# Patient Record
Sex: FEMALE | Race: WHITE | NOT HISPANIC OR LATINO | Employment: OTHER | URBAN - NONMETROPOLITAN AREA
[De-identification: names, ages, dates, MRNs, and addresses within clinical notes are randomized per-mention and may not be internally consistent; named-entity substitution may affect disease eponyms.]

---

## 2017-05-08 RX ORDER — ESTRADIOL 1 MG/1
TABLET ORAL
Qty: 30 TABLET | Refills: 12 | Status: SHIPPED | OUTPATIENT
Start: 2017-05-08 | End: 2018-06-11 | Stop reason: SDUPTHER

## 2018-06-11 RX ORDER — ESTRADIOL 1 MG/1
1 TABLET ORAL DAILY
Qty: 30 TABLET | Refills: 12 | Status: SHIPPED | OUTPATIENT
Start: 2018-06-11 | End: 2020-09-02 | Stop reason: SDUPTHER

## 2018-07-17 ENCOUNTER — OFFICE VISIT (OUTPATIENT)
Dept: OBSTETRICS AND GYNECOLOGY | Facility: CLINIC | Age: 53
End: 2018-07-17

## 2018-07-17 VITALS
DIASTOLIC BLOOD PRESSURE: 71 MMHG | BODY MASS INDEX: 30.55 KG/M2 | HEIGHT: 62 IN | WEIGHT: 166 LBS | SYSTOLIC BLOOD PRESSURE: 119 MMHG

## 2018-07-17 DIAGNOSIS — E66.9 OBESITY (BMI 30.0-34.9): Chronic | ICD-10-CM

## 2018-07-17 DIAGNOSIS — G43.C0 PERIODIC HEADACHE SYNDROME, NOT INTRACTABLE: Chronic | ICD-10-CM

## 2018-07-17 DIAGNOSIS — N95.1 MENOPAUSAL SYNDROME: Primary | ICD-10-CM

## 2018-07-17 DIAGNOSIS — M35.9 CONNECTIVE TISSUE DISEASE (HCC): Chronic | ICD-10-CM

## 2018-07-17 PROBLEM — G43.909 MIGRAINE: Chronic | Status: ACTIVE | Noted: 2018-07-17

## 2018-07-17 PROCEDURE — 99203 OFFICE O/P NEW LOW 30 MIN: CPT | Performed by: OBSTETRICS & GYNECOLOGY

## 2018-07-17 RX ORDER — ROPINIROLE 0.25 MG/1
TABLET, FILM COATED ORAL
COMMUNITY
End: 2019-11-20

## 2018-07-17 RX ORDER — LISINOPRIL 5 MG/1
TABLET ORAL
COMMUNITY
Start: 2018-05-25

## 2018-07-17 RX ORDER — HYDROCODONE BITARTRATE AND ACETAMINOPHEN 10; 325 MG/1; MG/1
TABLET ORAL
COMMUNITY
End: 2019-11-20

## 2018-07-17 RX ORDER — ALPRAZOLAM 0.25 MG/1
TABLET ORAL
COMMUNITY

## 2018-07-17 RX ORDER — HYDROXYCHLOROQUINE SULFATE 200 MG/1
TABLET, FILM COATED ORAL
COMMUNITY

## 2018-07-17 RX ORDER — GABAPENTIN 300 MG/1
CAPSULE ORAL
COMMUNITY

## 2018-07-17 RX ORDER — ESTRADIOL 1 MG/1
1 TABLET ORAL DAILY
Qty: 90 TABLET | Refills: 4 | Status: SHIPPED | OUTPATIENT
Start: 2018-07-17 | End: 2019-09-05 | Stop reason: SDUPTHER

## 2018-07-17 RX ORDER — ESTRADIOL 1 MG/1
TABLET ORAL
COMMUNITY
End: 2018-07-17 | Stop reason: SDUPTHER

## 2019-09-05 ENCOUNTER — TELEPHONE (OUTPATIENT)
Dept: OBSTETRICS AND GYNECOLOGY | Facility: CLINIC | Age: 54
End: 2019-09-05

## 2019-09-05 RX ORDER — ESTRADIOL 1 MG/1
1 TABLET ORAL DAILY
Qty: 90 TABLET | Refills: 0 | Status: SHIPPED | OUTPATIENT
Start: 2019-09-05 | End: 2019-11-11 | Stop reason: SDUPTHER

## 2019-09-05 NOTE — TELEPHONE ENCOUNTER
PATIENT IS NEEDING TO GET A ONE MONTH REFILL ON HER ESTRODIAL SENT TO [facility]. SHE HAS AN APPT WITH PER ON 10/09

## 2019-09-18 ENCOUNTER — TELEPHONE (OUTPATIENT)
Dept: OBSTETRICS AND GYNECOLOGY | Facility: CLINIC | Age: 54
End: 2019-09-18

## 2019-11-11 ENCOUNTER — PROCEDURE VISIT (OUTPATIENT)
Dept: OBSTETRICS AND GYNECOLOGY | Facility: CLINIC | Age: 54
End: 2019-11-11

## 2019-11-11 VITALS
HEIGHT: 62 IN | SYSTOLIC BLOOD PRESSURE: 108 MMHG | WEIGHT: 174 LBS | BODY MASS INDEX: 32.02 KG/M2 | DIASTOLIC BLOOD PRESSURE: 70 MMHG

## 2019-11-11 DIAGNOSIS — M35.9 CONNECTIVE TISSUE DISEASE (HCC): ICD-10-CM

## 2019-11-11 DIAGNOSIS — M54.2 NECK PAIN: ICD-10-CM

## 2019-11-11 DIAGNOSIS — N64.4 BREAST PAIN, LEFT: Primary | ICD-10-CM

## 2019-11-11 PROCEDURE — 99213 OFFICE O/P EST LOW 20 MIN: CPT | Performed by: NURSE PRACTITIONER

## 2019-11-11 RX ORDER — ESTRADIOL 1 MG/1
1 TABLET ORAL DAILY
Qty: 90 TABLET | Refills: 4 | Status: SHIPPED | OUTPATIENT
Start: 2019-11-11 | End: 2020-03-10

## 2019-11-12 NOTE — PROGRESS NOTES
"Subjective   Chief Complaint   Patient presents with   • Gynecologic Exam     annual breast exam and mammogram      Tiffani Fabian is a 54 y.o. year old  presenting to be seen because of left breast pain at the 3 o'clock position. The pain first started about 2 and a half months ago. It starts in the axilla and radiates through the breast into the nipple. It is intermittent and unrelieved by tylenol and ibuprofen. .    · Last mammogram: was done on approximately 2018 and the result was: Birads II (Benign findings). She is scheduled for her screening mammogram at [facility] in December.   · Last breast MRI: she has never had a MRI    No Additional Complaints Reported   Needs refills on HRT.    The following portions of the patient's history were reviewed and updated as appropriate:problem list, current medications, allergies, past family history, past medical history, past social history and past surgical history    Review of Systems   Constitutional: Positive for fatigue. Negative for activity change, appetite change, diaphoresis, unexpected weight gain and unexpected weight loss.   Respiratory: Negative for chest tightness and shortness of breath.    Cardiovascular: Negative for chest pain and palpitations.   Genitourinary: Positive for breast pain. Negative for breast discharge, breast lump, decreased libido, dyspareunia, pelvic pressure, urinary incontinence, vaginal bleeding, vaginal discharge and vaginal pain.   Musculoskeletal: Positive for arthralgias, myalgias and neck pain. Negative for joint swelling.        Hx of connective tissue disorder a/w chronic joint pain and disc degeneration. Has been struggling with neck pain for about 3 months.         Objective   /70   Ht 157.5 cm (62\")   Wt 78.9 kg (174 lb)   Breastfeeding? No   BMI 31.83 kg/m²     General:  well developed; well nourished  no acute distress  appears stated age   Breasts:  Examined in supine position  Symmetric without " masses or skin dimpling  Nipples normal without inversion, lesions or discharge  There are no palpable axillary nodes  Mastectomy site well healed without palpable abnormalities  The is myofascial pain noted coming from the scapula, around the axilla and into the chest wall.      Lab Review   No data reviewed    Imaging   No data reviewed      Diagnoses and all orders for this visit:    Breast pain, left    Neck pain  -     Ambulatory Referral to Physical Therapy Evaluate and treat    Connective tissue disease (CMS/HCC)  -     Ambulatory Referral to Physical Therapy Evaluate and treat    Other orders  -     estradiol (ESTRACE) 1 MG tablet; Take 1 tablet by mouth Daily.    R/B/A and potential s/e of HRT reviewed. Referral sent to PFPT with Jacquelyn, possibly for dry-needling, but for eval and treat of neck pain.           This note was electronically signed.    Cristine Bentley, YASMANY  November 12, 2019

## 2019-11-20 ENCOUNTER — OFFICE VISIT (OUTPATIENT)
Dept: FAMILY MEDICINE CLINIC | Facility: CLINIC | Age: 54
End: 2019-11-20

## 2019-11-20 VITALS
HEART RATE: 84 BPM | OXYGEN SATURATION: 96 % | TEMPERATURE: 97.6 F | SYSTOLIC BLOOD PRESSURE: 124 MMHG | WEIGHT: 173.25 LBS | DIASTOLIC BLOOD PRESSURE: 86 MMHG | RESPIRATION RATE: 20 BRPM | HEIGHT: 62 IN | BODY MASS INDEX: 31.88 KG/M2

## 2019-11-20 DIAGNOSIS — N39.0 ACUTE UTI: Primary | ICD-10-CM

## 2019-11-20 DIAGNOSIS — R30.0 DYSURIA: ICD-10-CM

## 2019-11-20 LAB
BILIRUB BLD-MCNC: ABNORMAL MG/DL
CLARITY, POC: CLEAR
COLOR UR: ABNORMAL
GLUCOSE UR STRIP-MCNC: NEGATIVE MG/DL
KETONES UR QL: NEGATIVE
LEUKOCYTE EST, POC: ABNORMAL
NITRITE UR-MCNC: POSITIVE MG/ML
PH UR: 6 [PH] (ref 5–8)
PROT UR STRIP-MCNC: NEGATIVE MG/DL
RBC # UR STRIP: NEGATIVE /UL
SP GR UR: 1.02 (ref 1–1.03)
UROBILINOGEN UR QL: ABNORMAL

## 2019-11-20 PROCEDURE — 87086 URINE CULTURE/COLONY COUNT: CPT | Performed by: NURSE PRACTITIONER

## 2019-11-20 PROCEDURE — 99202 OFFICE O/P NEW SF 15 MIN: CPT | Performed by: NURSE PRACTITIONER

## 2019-11-20 PROCEDURE — 81003 URINALYSIS AUTO W/O SCOPE: CPT | Performed by: NURSE PRACTITIONER

## 2019-11-20 PROCEDURE — 96372 THER/PROPH/DIAG INJ SC/IM: CPT | Performed by: NURSE PRACTITIONER

## 2019-11-20 RX ORDER — SULFAMETHOXAZOLE AND TRIMETHOPRIM 800; 160 MG/1; MG/1
1 TABLET ORAL 2 TIMES DAILY
Qty: 20 TABLET | Refills: 0 | Status: SHIPPED | OUTPATIENT
Start: 2019-11-20

## 2019-11-20 RX ORDER — PREDNISONE 1 MG/1
1 TABLET ORAL DAILY
COMMUNITY

## 2019-11-20 RX ORDER — CEFTRIAXONE 1 G/1
1 INJECTION, POWDER, FOR SOLUTION INTRAMUSCULAR; INTRAVENOUS ONCE
Status: COMPLETED | OUTPATIENT
Start: 2019-11-20 | End: 2019-11-20

## 2019-11-20 RX ORDER — FLUCONAZOLE 150 MG/1
TABLET ORAL
Qty: 2 TABLET | Refills: 0 | Status: SHIPPED | OUTPATIENT
Start: 2019-11-20

## 2019-11-20 RX ADMIN — CEFTRIAXONE 1 G: 1 INJECTION, POWDER, FOR SOLUTION INTRAMUSCULAR; INTRAVENOUS at 08:23

## 2019-11-20 NOTE — PROGRESS NOTES
"Subjective   Tiffani Fabian is a 54 y.o. female.     [facility] Visit    PCP: Dr. Haider    CC: \"UTI\"    PMH: Connective tissue disease, [place], back surgery x 2, hand surgery x 2, hysterectomy    Reports she was busy and didn't drink much water for a few days and then symptoms started.  Usually has a UTI 1-2 times per year.  Last treated about 6 months ago.       Urinary Tract Infection    This is a new problem. Episode onset: 11-16. The problem occurs every urination. The problem has been gradually worsening. The quality of the pain is described as aching and burning. The patient is experiencing no pain (current due to taking Azo, but was up most of the night). There has been no fever. Associated symptoms include frequency and urgency. Pertinent negatives include no chills, discharge, flank pain, hematuria, hesitancy, nausea, possible pregnancy, sweats or vomiting. Treatments tried: azo, increased water. The treatment provided mild relief. Her past medical history is significant for recurrent UTIs.        The following portions of the patient's history were reviewed and updated as appropriate: allergies, current medications, past medical history, past social history, past surgical history and problem list.    Review of Systems   Constitutional: Negative for appetite change, chills, fatigue and fever.   Respiratory: Negative.    Cardiovascular: Negative.    Gastrointestinal: Negative for abdominal pain, nausea and vomiting.   Genitourinary: Positive for dysuria, frequency, pelvic pressure ( suprapubic) and urgency. Negative for difficulty urinating, flank pain, hematuria and hesitancy.   Musculoskeletal: Positive for back pain (low).   Skin: Negative for rash.   Neurological: Negative for dizziness and headache.     /86 (BP Location: Right arm, Patient Position: Sitting, Cuff Size: Adult)   Pulse 84   Temp 97.6 °F (36.4 °C) (Oral)   Resp 20   Ht 157.5 cm (62\")   Wt 78.6 kg (173 lb 4 oz)   " SpO2 96%   Breastfeeding? No   BMI 31.69 kg/m²     Objective   Physical Exam   Constitutional: She is oriented to person, place, and time. She appears well-developed and well-nourished. No distress.   Cardiovascular: Normal rate and regular rhythm.   Pulmonary/Chest: Effort normal and breath sounds normal. She has no wheezes. She has no rales.   Abdominal: Soft. Bowel sounds are normal. There is tenderness ( suprapubic). There is no rebound and no guarding.   Genitourinary:   Genitourinary Comments: No flank pain noted   Neurological: She is alert and oriented to person, place, and time.   Nursing note and vitals reviewed.    Recent Results (from the past 24 hour(s))   POCT urinalysis dipstick, automated    Collection Time: 11/20/19  8:24 AM   Result Value Ref Range    Color Orange (A) Yellow, Straw, Dark Yellow, Rosita    Clarity, UA Clear Clear    Specific Gravity  1.020 1.005 - 1.030    pH, Urine 6.0 5.0 - 8.0    Leukocytes Trace (A) Negative    Nitrite, UA Positive (A) Negative    Protein, POC Negative Negative mg/dL    Glucose, UA Negative Negative, 1000 mg/dL (3+) mg/dL    Ketones, UA Negative Negative    Urobilinogen, UA 1 E.U./dL  (A) Normal    Bilirubin 1 mg/dL (A) Negative    Blood, UA Negative Negative     No Images in the past 120 days found..      Assessment/Plan   Tiffani was seen today for urinary tract infection.    Diagnoses and all orders for this visit:    Acute UTI  -     Urine Culture - Urine, Urine, Clean Catch  -     cefTRIAXone (ROCEPHIN) injection 1 g  -     sulfamethoxazole-trimethoprim (BACTRIM DS) 800-160 MG per tablet; Take 1 tablet by mouth 2 (Two) Times a Day.    Dysuria  -     POCT urinalysis dipstick, automated    Other orders  -     fluconazole (DIFLUCAN) 150 MG tablet; 1 tab po x 1 now, may repeat in 4 days prn yeast      Increase water, decrease caffeine  Empty bladder frequently  Tylenol as needed  Rocephin 1 gm IM x 1 in office  Rx for Bactrim to begin tonight  Urine culture  pending--will call if antibiotics need to be changed  May use Azo for the next few days as needed    Patient's Body mass index is 31.69 kg/m². BMI is above normal parameters. Recommendations include: referral to primary care.    See PCP or RTC if symptoms persist/worsen  See PCP for routine f/u visit and management of chronic medical conditions

## 2019-11-20 NOTE — PATIENT INSTRUCTIONS
Urinary Tract Infection, Adult    A urinary tract infection (UTI) is an infection of any part of the urinary tract. The urinary tract includes the kidneys, ureters, bladder, and urethra. These organs make, store, and get rid of urine in the body.  Your health care provider may use other names to describe the infection. An upper UTI affects the ureters and kidneys (pyelonephritis). A lower UTI affects the bladder (cystitis) and urethra (urethritis).  What are the causes?  Most urinary tract infections are caused by bacteria in your genital area, around the entrance to your urinary tract (urethra). These bacteria grow and cause inflammation of your urinary tract.  What increases the risk?  You are more likely to develop this condition if:  · You have a urinary catheter that stays in place (indwelling).  · You are not able to control when you urinate or have a bowel movement (you have incontinence).  · You are female and you:  ? Use a spermicide or diaphragm for birth control.  ? Have low estrogen levels.  ? Are pregnant.  · You have certain genes that increase your risk (genetics).  · You are sexually active.  · You take antibiotic medicines.  · You have a condition that causes your flow of urine to slow down, such as:  ? An enlarged prostate, if you are male.  ? Blockage in your urethra (stricture).  ? A kidney stone.  ? A nerve condition that affects your bladder control (neurogenic bladder).  ? Not getting enough to drink, or not urinating often.  · You have certain medical conditions, such as:  ? Diabetes.  ? A weak disease-fighting system (immunesystem).  ? Sickle cell disease.  ? Gout.  ? Spinal cord injury.  What are the signs or symptoms?  Symptoms of this condition include:  · Needing to urinate right away (urgently).  · Frequent urination or passing small amounts of urine frequently.  · Pain or burning with urination.  · Blood in the urine.  · Urine that smells bad or unusual.  · Trouble urinating.  · Cloudy  urine.  · Vaginal discharge, if you are female.  · Pain in the abdomen or the lower back.  You may also have:  · Vomiting or a decreased appetite.  · Confusion.  · Irritability or tiredness.  · A fever.  · Diarrhea.  The first symptom in older adults may be confusion. In some cases, they may not have any symptoms until the infection has worsened.  How is this diagnosed?  This condition is diagnosed based on your medical history and a physical exam. You may also have other tests, including:  · Urine tests.  · Blood tests.  · Tests for sexually transmitted infections (STIs).  If you have had more than one UTI, a cystoscopy or imaging studies may be done to determine the cause of the infections.  How is this treated?  Treatment for this condition includes:  · Antibiotic medicine.  · Over-the-counter medicines to treat discomfort.  · Drinking enough water to stay hydrated.  If you have frequent infections or have other conditions such as a kidney stone, you may need to see a health care provider who specializes in the urinary tract (urologist).  In rare cases, urinary tract infections can cause sepsis. Sepsis is a life-threatening condition that occurs when the body responds to an infection. Sepsis is treated in the hospital with IV antibiotics, fluids, and other medicines.  Follow these instructions at home:    Medicines  · Take over-the-counter and prescription medicines only as told by your health care provider.  · If you were prescribed an antibiotic medicine, take it as told by your health care provider. Do not stop using the antibiotic even if you start to feel better.  General instructions  · Make sure you:  ? Empty your bladder often and completely. Do not hold urine for long periods of time.  ? Empty your bladder after sex.  ? Wipe from front to back after a bowel movement if you are female. Use each tissue one time when you wipe.  · Drink enough fluid to keep your urine pale yellow.  · Keep all follow-up  visits as told by your health care provider. This is important.  Contact a health care provider if:  · Your symptoms do not get better after 1-2 days.  · Your symptoms go away and then return.  Get help right away if you have:  · Severe pain in your back or your lower abdomen.  · A fever.  · Nausea or vomiting.  Summary  · A urinary tract infection (UTI) is an infection of any part of the urinary tract, which includes the kidneys, ureters, bladder, and urethra.  · Most urinary tract infections are caused by bacteria in your genital area, around the entrance to your urinary tract (urethra).  · Treatment for this condition often includes antibiotic medicines.  · If you were prescribed an antibiotic medicine, take it as told by your health care provider. Do not stop using the antibiotic even if you start to feel better.  · Keep all follow-up visits as told by your health care provider. This is important.  This information is not intended to replace advice given to you by your health care provider. Make sure you discuss any questions you have with your health care provider.  Document Released: 09/27/2006 Document Revised: 06/27/2019 Document Reviewed: 06/27/2019  [employer] Interactive Patient Education © 2019 [employer]

## 2019-11-22 LAB — BACTERIA SPEC AEROBE CULT: NO GROWTH

## 2019-11-25 ENCOUNTER — TREATMENT (OUTPATIENT)
Dept: PHYSICAL THERAPY | Facility: CLINIC | Age: 54
End: 2019-11-25

## 2019-11-25 DIAGNOSIS — G43.C0 PERIODIC HEADACHE SYNDROME, NOT INTRACTABLE: ICD-10-CM

## 2019-11-25 DIAGNOSIS — M35.9 CONNECTIVE TISSUE DISEASE (HCC): ICD-10-CM

## 2019-11-25 DIAGNOSIS — M54.2 NECK PAIN: Primary | ICD-10-CM

## 2019-11-25 PROCEDURE — 97162 PT EVAL MOD COMPLEX 30 MIN: CPT | Performed by: PHYSICAL THERAPIST

## 2019-11-25 PROCEDURE — 97110 THERAPEUTIC EXERCISES: CPT | Performed by: PHYSICAL THERAPIST

## 2019-11-25 NOTE — PROGRESS NOTES
"  Physical Therapy Initial Evaluation and Plan of Care      Patient: Tiffani Fabian   : 1965  Diagnosis/ICD-10 Code:  Neck pain [M54.2]  Referring practitioner: YASMANY Red  Date of Initial Visit: 2019  Today's Date: 2019  Patient seen for 1 sessions    Next MD appt: PRN .  Recertification: 2019           Subjective Questionnaire: NDI:23/50 = 46%      Subjective Evaluation    History of Present Illness  Onset date: ~1.5 years.    Subjective comment: Patient reports she hates her neck. She rpeorts for about a year and a half she has had issues. She reports she has seen Dr. Brown which didn't seem to help any. She reports it is continious pain. She rpeorts looking up really bother her. She rpeorts at night it is the worst tryingto get comfortable.  She reports she is constantly giving into it. She reports even bending over bother it. She reports it feels like her head weighs 800#. She reports chin tucks do help some.Quality of life: good    Pain  Current pain ratin  At best pain rating: 3  At worst pain rating: 10  Location: Neck and L shoulder  Quality: burning, dull ache and knife-like  Relieving factors: heat and medications  Aggravating factors: movement, overhead activity, prolonged positioning and sleeping    Social Support  Lives with: spouse    Hand dominance: right    Diagnostic Tests  X-ray: abnormal (DDD)    Treatments  Previous treatment: physical therapy  Current treatment: medication  Patient Goals  Patient goals for therapy: decreased pain  Patient goal: \"Avoid surgery and how to cope\"           Objective       Static Posture     Head  Forward.    Cervical Spine  Tilted left.    Shoulders  Rounded.    Comments  Leans to the L in sitting.    Postural Observations  Seated posture: poor  Standing posture: fair  Correction of posture: makes symptoms worse        Palpation     Right Tenderness of the scalenes and upper trapezius.     Neurological Testing "     Sensation   Cervical/Thoracic   Left   Intact: light touch and hot/cold discrimination    Right   Intact: light touch and hot/cold discrimination    Active Range of Motion   Cervical/Thoracic Spine   Cervical    Flexion: 52 degrees   Extension: 42 degrees   Left lateral flexion: 25 degrees   Right lateral flexion: 25 degrees   Left rotation: 62 degrees   Right rotation: 52 degrees     Strength/Myotome Testing   Cervical Spine   Neck extension: 3+  Neck flexion: 3+    Left   Neck lateral flexion (C3): 3    Right   Neck lateral flexion (C3): 3    Left Shoulder     Planes of Motion   Flexion: 3+   Abduction: 3+     Right Shoulder     Planes of Motion   Flexion: 3+   Abduction: 3+     Left Elbow   Flexion: 3+  Extension: 3+    Right Elbow   Flexion: 3+  Extension: 3+    Tests   Cervical     Left   Negative cervical distraction, Spurling's sign and transverse ligament.     Right   Negative cervical distraction, Spurling's sign and transverse ligament.     Left Shoulder   Negative active compression ([place]).     Right Shoulder   Negative active compression ([place]).     Additional Tests Details  Negative vertebral artery B.    Ambulation     Comments   FWB, non-antalgic gait, no distress, no assistive device, no significant gait deviation, normal arm swing with gait.         Assessment & Plan     Assessment  Impairments: abnormal or restricted ROM, activity intolerance, impaired physical strength, lacks appropriate home exercise program and pain with function  Assessment details: Patient has significant muscular tension and weakness and presents with chronic issues. Patient would benefit from PT for stretches, strengthening, posture, and deep cervical re-education.  Prognosis: fair  Prognosis details: Barriers to Rehab: Include significant or possible arthritic/degenerative changes that have occurred within the spine, The chronicity of this issue, other connective tissue disease.    Safety Issues: None  noted.    Functional Limitations: carrying objects, lifting, sleeping, pulling, pushing, uncomfortable because of pain, sitting and reaching overhead  Goals  Plan Goals: Short Term Goals:  1) I with HEP and have additions/changes by next re-certification.    2) Patient to be more aware of posture and posture correction technique.    3) AROM cervical flexion/extension >=55° each.    4) AROM B cervical SB >= 35°.    5) AROM B cervical ROT >=70°.    4) C-spine >= 4/5.    5) B UE >= 4/5.      Long Term Goals:  1) AROM for the cervical spine all WNL, no increase in pain.    2) B UE 5/5, no increase in pain.    3) C-spine 5/5, no increase in pain.    4) patient to report headaches are no longer daily.    5) NDI score of <= 30%.    6) I with final HEP.    7) D/C with a final HEP and free 30 day fitness formula [employer]bership.      Plan  Therapy options: will be seen for skilled physical therapy services  Planned modality interventions: cryotherapy, high voltage pulsed current (pain management) and thermotherapy (hydrocollator packs)  Planned therapy interventions: flexibility, body mechanics training, home exercise program, joint mobilization, manual therapy, neuromuscular re-education, postural training, soft tissue mobilization, spinal/joint mobilization, strengthening, stretching and therapeutic activities  Duration in visits: 20  Treatment plan discussed with: patient  Plan details: Progress overall posture, strength, neck stability. Add bobble heads next session.    Other therapeutic activities and/or exercises will be prescribed depending on the patients progress or lack there of.    Visit Diagnoses:    ICD-10-CM ICD-9-CM   1. Neck pain M54.2 723.1   2. Connective tissue disease (CMS/[facility]) M35.9 710.9   3. Periodic headache syndrome, not intractable G43.C0 346.20       Timed:  Manual Therapy:         mins  64312;  Therapeutic Exercise:    24     mins  20949;     Neuromuscular Kathy:        mins  35850;    Therapeutic  Activity:          mins  07186;     Gait Training:           mins  48134;     Ultrasound:          mins  85580;    Electrical Stimulation:         mins  15479 ( );    Untimed:  Electrical Stimulation:         mins  48803 ( );  Mechanical Traction:         mins  23797;     Timed Treatment:   24   mins   Total Treatment:     61   mins    PT SIGNATURE: Jacquelyn Mason, MALCOLM DPT, [facility]   DATE TREATMENT INITIATED: 11/25/2019    Initial Certification  Certification Period: 2/23/2020  I certify that the therapy services are furnished while this patient is under my care.  The services outlined above are required by this patient, and will be reviewed every 90 days.     PHYSICIAN: Cristine Bentley, APRN      DATE:     Please sign and return via fax to  .. Thank you, [facility].

## 2019-12-19 ENCOUNTER — TREATMENT (OUTPATIENT)
Dept: PHYSICAL THERAPY | Facility: CLINIC | Age: 54
End: 2019-12-19

## 2019-12-19 DIAGNOSIS — G43.C0 PERIODIC HEADACHE SYNDROME, NOT INTRACTABLE: ICD-10-CM

## 2019-12-19 DIAGNOSIS — M54.2 NECK PAIN: Primary | ICD-10-CM

## 2019-12-19 DIAGNOSIS — M35.9 CONNECTIVE TISSUE DISEASE (HCC): ICD-10-CM

## 2019-12-19 PROCEDURE — 97110 THERAPEUTIC EXERCISES: CPT | Performed by: PHYSICAL THERAPIST

## 2019-12-19 NOTE — PROGRESS NOTES
Physical Therapy Daily Progress Note    Patient: Tiffani Fabian   : 1965  Diagnosis/ICD-10 Code:     Diagnosis Plan   1. Neck pain     2. Connective tissue disease (CMS/HCC)     3. Periodic headache syndrome, not intractable       Referring practitioner: YASMANY Red  Date of Initial Visit: Type: THERAPY  Noted: 2019  Today's Date: 2019  Patient seen for 2 sessions      PT Recheck Due: 2019  PT MD Visit: PRN       Tiffani Fabian      Subjective Evaluation    History of Present Illness    Subjective comment: states that she just drove 12 hours back from [place] the other day. always has pain after long driving like that. had a headache throughout the night. with pedaling backwards had a couple of sharp pains that went up her neck. travels back and forth to [facility] every few weeks and stays for 2-3 weeks at a time. difficult to be consistent with physical therapy. has not been completing exercises at home as issued on evaluation day. Pain  Current pain ratin             Objective   See Exercise, Manual, and Modality Logs for complete treatment.       Assessment & Plan     Assessment  Assessment details: Per patient report, patient is non compliant with HEP. Has had no visits of physical therapy since she was first evaluated. Cueing for appropriate technique with all therex.     Goals  Plan Goals:  Short Term Goals:  1) I with HEP and have additions/changes by next re-certification.    2) Patient to be more aware of posture and posture correction technique.    3) AROM cervical flexion/extension >=55° each.    4) AROM B cervical SB >= 35°.    5) AROM B cervical ROT >=70°.    4) C-spine >= 4/5.    5) B UE >= 4/5.      Long Term Goals:  1) AROM for the cervical spine all WNL, no increase in pain.    2) B UE 5/5, no increase in pain.    3) C-spine 5/5, no increase in pain.    4) patient to report headaches are no longer daily.    5) NDI score of <= 30%.    6) I with final  HEP.    7) D/C with a final HEP and free 30 day fitness formula [facility].    Plan  Plan details: Recheck must be completed next visit      Progress strengthening /stabilization /functional activity and Other: Recheck must be completed next visit            Timed:  Manual Therapy:         mins  22906;  Therapeutic Exercise:    40     mins  16182;   Aquatic Therex :        mins  78244    Neuromuscular Kathy:        mins  81515;    Therapeutic Activity:          mins  41372;     Gait Training:           mins  02702;     Ultrasound:          mins  33473;    Electrical Stimulation:         mins  63763 ( );    Untimed:  Electrical Stimulation:         mins  74623 ( );  Mechanical Traction:         mins  09920;   Orthotic fit/train:         mins  68384    Timed Treatment:   40   mins   Total Treatment:     55   mins  Brittany Curran PTA  Physical Therapist Assistant

## 2019-12-27 ENCOUNTER — TREATMENT (OUTPATIENT)
Dept: PHYSICAL THERAPY | Facility: CLINIC | Age: 54
End: 2019-12-27

## 2019-12-27 DIAGNOSIS — G43.C0 PERIODIC HEADACHE SYNDROME, NOT INTRACTABLE: ICD-10-CM

## 2019-12-27 DIAGNOSIS — M35.9 CONNECTIVE TISSUE DISEASE (HCC): ICD-10-CM

## 2019-12-27 DIAGNOSIS — M54.2 NECK PAIN: Primary | ICD-10-CM

## 2019-12-27 PROCEDURE — 97110 THERAPEUTIC EXERCISES: CPT | Performed by: PHYSICAL THERAPIST

## 2019-12-27 PROCEDURE — 97140 MANUAL THERAPY 1/> REGIONS: CPT | Performed by: PHYSICAL THERAPIST

## 2019-12-27 NOTE — PROGRESS NOTES
Re-Assessment / Re-Certification        Patient: Tiffani Fabian   : 1965  Diagnosis/ICD-10 Code:  Neck pain [M54.2]  Referring practitioner: YASMANY Red  Date of Initial Visit: Type: THERAPY  Noted: 2019  Today's Date: 2019  Patient seen for 3 sessions  MD none  REcert 20    Subjective:   Tiffani Fabian reports: pt admits to non compliance at home and keeping appointments.  Goes out of town to stay with  in  frequently.  Subjective Questionnaire: NDI:46%  Clinical Progress: none at this time.   Home Program Compliance: No  Treatment has included: therapeutic exercise, therapeutic activity and moist heat    Subjective   Objective   Cervical flexion 37, ext 25, R rotation 40 left 56. SB right 22 and left 20  TTP suboccipitals and left C3-4 paraspinal region,  Left rotation of vertebra    Assessment/Plan  Lacks goal attainment due to non compliance and non attendance.    Visit Diagnoses:    ICD-10-CM ICD-9-CM   1. Neck pain M54.2 723.1   2. Connective tissue disease (CMS/HCC) M35.9 710.9   3. Periodic headache syndrome, not intractable G43.C0 346.20   Goals   Short Term Goals:  1) I with HEP and have additions/changes by next re-certification. met  2) Patient to be more aware of posture and posture correction technique. Not met  3) AROM cervical flexion/extension >=55° each. not met  4) AROM B cervical SB >= 35°. Not  met  5) AROM B cervical ROT >=70°. Not met  4) C-spine >= 4/5. not met  5) B UE >= 4/5. Not met    Long Term Goals:  1) AROM for the cervical spine all WNL, no increase in pain.  2) B UE 5/5, no increase in pain.  3) C-spine 5/5, no increase in pain.  4) patient to report headaches are no longer daily.  5) NDI score of <= 30%.  6) I with final HEP.  7) D/C with a final HEP and free 30 day fitness formula memembership.     Progress toward previous goals: Not Met        Recommendations: Continue as planned  Timeframe: 3 weeks  Prognosis to achieve goals:  good    PT Signature: Jaleesa Rico, PT DPT      Based upon review of the patient's progress and continued therapy plan, it is my medical opinion that Tiffani Fabian should continue physical therapy treatment at [facility]  [address]   [place] [address] [phone].    Signature: __________________________________  Cristine Bentley APRN    Timed:  Manual Therapy:    15   mins  03098;  Therapeutic Exercise:    25   mins  36237;     Neuromuscular Kathy:        mins  23705;    Therapeutic Activity:          mins  94901;     Gait Training:           mins  66293;     Ultrasound:          mins  40073;    Electrical Stimulation:         mins  22412 ( );    Untimed:  Electrical Stimulation:         mins  61768 ( );  Mechanical Traction:         mins  44785;     Timed Treatment:     40 mins   Total Treatment:   40    mins

## 2020-01-02 ENCOUNTER — TELEPHONE (OUTPATIENT)
Dept: PHYSICAL THERAPY | Facility: CLINIC | Age: 55
End: 2020-01-02

## 2020-03-10 RX ORDER — ESTRADIOL 1 MG/1
TABLET ORAL
Qty: 90 TABLET | Refills: 1 | Status: SHIPPED | OUTPATIENT
Start: 2020-03-10 | End: 2020-09-02

## 2020-08-17 NOTE — TELEPHONE ENCOUNTER
PATIENT CALLED STATING THAT HER MOTHER HAD KNEE SURGERY THEN STARTED HAVING ISSUES WITH HER HEART. SHE IS CURRENTLY STILL IN THE HOSPITAL SO SHE HAS HAD A LOT GOING ON. SHE WILL CALL BACK TO RESCHEDULE WITH THINGS GET BACK TO A LITTLE MORE NORMAL.

## 2020-09-02 RX ORDER — ESTRADIOL 1 MG/1
TABLET ORAL
Qty: 90 TABLET | Refills: 0 | Status: SHIPPED | OUTPATIENT
Start: 2020-09-02 | End: 2020-12-04

## 2020-12-04 RX ORDER — ESTRADIOL 1 MG/1
TABLET ORAL
Qty: 90 TABLET | Refills: 4 | Status: SHIPPED | OUTPATIENT
Start: 2020-12-04